# Patient Record
Sex: FEMALE | Race: WHITE | Employment: UNEMPLOYED | URBAN - METROPOLITAN AREA
[De-identification: names, ages, dates, MRNs, and addresses within clinical notes are randomized per-mention and may not be internally consistent; named-entity substitution may affect disease eponyms.]

---

## 2018-12-03 RX ORDER — METFORMIN HYDROCHLORIDE 500 MG/1
500 TABLET ORAL 2 TIMES DAILY
COMMUNITY

## 2018-12-03 RX ORDER — PANTOPRAZOLE SODIUM 40 MG/1
40 TABLET, DELAYED RELEASE ORAL DAILY
COMMUNITY

## 2018-12-04 NOTE — H&P
Date: 10/22/2018 8:39 AM  
Patient Name: Hue Cesar Account #: [id number] Gender: Female  (age): 1970 (57) Provider:    
Marleny Arana. Nina Vela PA-C Referring Physician:    
Mateusz Yun MD 
[address] [place] [address] Street 
[phone] (phone) 
[phone] (fax) Chief Complaint: pt in for vomiting abdominal pain History of Present Illness:  
Patient is here today for an initial visit. She is here today with intermittent episodes of nausea and vomiting x 3 months. Usually occurs in the morning or wakes patient up in the middle night. Nausea tends to reoccur throughout the day and interferes with her eating. Emesis content is food and yellow liquid. Denies hematemesis. Denies dysphagia. Occasionally has associated abdominal pain but she attributes this to her history of IBS. Does have a history of GERD but states her symptoms are usually controlled with pantoprazole. Admits that since foot surgery  she has been taking ibuprofen a few times per week. Had had about 8-10lb weight loss but she's been working on portion control. EGD 2014 - normal 
Colonoscopy 2014 - normal (path: random colon biopsies unremarkable) Personal history of IBS-D: symptoms controlled with Bentyl TID  ? Patient is here today for an initial visit. She is here today with intermittent episodes of nausea and vomiting x 3 months. Usually occurs in the morning or wakes patient up in the middle night. Nausea tends to reoccur throughout the day and interferes with her eating. Emesis content is food and yellow liquid. Denies hematemesis. Denies dysphagia. Occasionally has associated abdominal pain but she attributes this to her history of IBS. Does have a history of GERD but states her symptoms are usually controlled with pantoprazole.  Admits that since foot surgery  she has been taking ibuprofen a few times per week. Had had about 8-10lb weight loss but she's been working on portion control. EGD 2014 - normal 
Colonoscopy 2014 - normal (path: random colon biopsies unremarkable) Personal history of IBS-D: symptoms controlled with Bentyl TID  ? Past Medical History Medical Conditions: Diabetes Mellitus 
schizoaffective disorder Sleep apnea Thyroid disease Surgical Procedures: achilles tendon surgery 
right knee surgery 
cholecystectomy Dx Studies: Abdominal U/S Colonoscopy CT Scan Endoscopy MRI Medications: benztropine 0.5 mg 
clozapine 100 mg 
furosemide 20 mg 
hydroxyzine pamoate 50 mg TAKE 1 TO 2 CAPSULES BY MOUTH AT BEDTIME IF NEEDED FOR ANXIETY OR INSO MNIA 
levothyroxine 150 mcg 
lithium carbonate 600 mg 
metformin 500 mg 
pantoprazole 40 mg 
risperidone 0.25 mg 
sertraline 100 mg 
trazodone 100 mg Xarelto 20 mg Allergies: Patient has no known allergies or drug allergies Immunizations: No Immunizations Social History Alcohol: None Tobacco: Never smoker Drugs: None Exercise: Exercise less than 3 times a week. Caffeine: Daily. Marital Status:   
  
  
Occupation:   
  
  
  
 
Family History No history of Colon Cancer, Colon Polyps, Esophogeal Cancer, GI Cancers, IBD (Crohn's or UC), Liver disease Father: Diagnosed with bladder cancer; Mother: Diagnosed with throat cancer and breast cancer;   
  
Review of Systems:  
Cardiovascular: Denies chest pain, irregular heart beat, palpitations, peripheral edema, syncope, Sweats. Constitutional: Presents suffers from fever. Denies fatigue, loss of appetite, weight gain, weight loss. ENMT: Denies nose bleeds, sore throat, hearing loss. Endocrine: Denies excessive thirst, heat intolerance. Eyes: Denies loss of vision. Gastrointestinal: Presents suffers from abdominal pain, diarrhea, nausea, vomiting.  Denies abdominal swelling, change in bowel habits, constipation, Bloating/gas, heartburn, jaundice, rectal bleeding, stomach cramps, dysphagia, rectal pain, Stool incontinence, hematemesis. Genitourinary: Denies dark urine, dysuria, frequent urination, hematuria, incontinence. Hematologic/Lymphatic: Denies easy bruising, prolonged bleeding. Integumentary: Denies itching, rashes, sun sensitivity. Musculoskeletal: Denies arthritis, back pain, gout, joint pain, muscle weakness, stiffness. Neurological: Denies dizziness, fainting, frequent headaches, memory loss. Psychiatric: Presents suffers from anxiety, depression, difficulty sleeping, nervousness. Denies hallucinations, panic attacks, paranoia. Respiratory: Presents suffers from cough. Denies dyspnea, wheezing. Vital Signs:  
BP 
(mmHg)  Pulse 
(ppm) Rhythm Weight (lbs/oz) Height (ft/in) BMI Temp  
149/82 78 Regular 242 /  5 / 1 45.72 98.5 (F) Physical Exam:  
Constitutional:   
 
Appearance: No distress, appears comfortable. Communication: Understands/receives spoken information. Head/face: Inspection: Normacephalic, atraumatic. Palpation: normal.  
Eyes:   
 
Conjunctivae/lids: Normal.  
Pupils/irises: Pupils equal, round and normal.  
ENMT:   
 
External: Normal.  
Hearing: Normal.  
Neck:   
 
Neck: Normal appearance, trachea midline. Respiratory:   
 
Effort: Normal respiratory effort, comfortable, speaks in complete sentences. Auscultation: normal breath sounds, no rubs, wheezes or rhonchi. Cardiovascular: Auscultation: normal, S1 and S2, no gallops , no rubs or murmurs . Gastrointestinal/Abdomen:   
 
Abdomen: bowel sounds active, soft, obese, mild tenderness in MANAN. No rebound or guarding. Junius El Psychiatric:   
 
Judgment/insight: Normal, normal judgement, normal insight. Orientation: oriented to time, space and person. Memory: normal short term memory, normal long term memory, no memory loss.   
Mood and affect: Normal mood, affect full, no evidence of depression, anxiety or agitation. Lab Results: No Electronic Results Impressions: Nausea with vomiting, unspecified Abdominal tenderness, epigastric Long term (current) use of anticoagulants Diabetes mellitus? ?Nausea with vomiting, unspecified? ? 
? ? Abdominal tenderness, epigastric? ? 
? ? Long term (current) use of anticoagulants? ? 
? ? Diabetes mellitus? Assessment: DDx: GERD, gastritis, PUD, gastroparesis. If EGD unremarkable and symptoms persist then GES to follow ? DDx: GERD, gastritis, PUD, gastroparesis. If EGD unremarkable and symptoms persist then GES to follow Plan: EGD Upper Endoscopy (EGD): options, risks, benefits and complications of bleeding, tear, surgical repair (1:1000) and reaction to medication, aspiration, and pneumonia explained to patient. 5% chance of missing lesions explained. All questions answered. To MA: Please obtain cardiac clearance for patient to stop Xarelto prior to EGD Education: GERD Diet Patient is instructed to Continue PPI at current doses. ? ?EGD? ? 
? ? Upper Endoscopy (EGD): options, risks, benefits and complications of bleeding, tear, surgical repair (1:1000) and reaction to medication, aspiration, and pneumonia explained to patient. 5% chance of missing lesions explained. All questions answered. ?  
? 
? ? To MA: Please obtain cardiac clearance for patient to stop Xarelto prior to EGD?  
? 
? ? Education: GERD Diet? ? ?Patient is instructed to ? Continue PPI at current doses. ? Risk & Medical Necessity: The patient requires Moderate Severity care for this visit. Diagnosis and management options are Extensive. The amount of data reviewed and/or ordered is Minimal/None. The level of risk is Moderate. Notes: Dr. Nico Jeffrey was available for consultation during this visit. John Scales. Philip Jimenez PA-C Electronically signed on 10/22/2018 9:03:48 AM by John Scales. Philip Jimenez PA-C [employer] Speaker. Artemus Kawasaki, MRN [id number],  1970 First Visit, Monday, 2018

## 2018-12-05 ENCOUNTER — ANESTHESIA EVENT (OUTPATIENT)
Dept: ENDOSCOPY | Age: 48
End: 2018-12-05
Payer: MEDICARE

## 2018-12-05 ENCOUNTER — HOSPITAL ENCOUNTER (OUTPATIENT)
Age: 48
Setting detail: OUTPATIENT SURGERY
Discharge: HOME | End: 2018-12-05
Attending: SPECIALIST | Admitting: SPECIALIST
Payer: MEDICARE

## 2018-12-05 ENCOUNTER — ANESTHESIA (OUTPATIENT)
Dept: ENDOSCOPY | Age: 48
End: 2018-12-05
Payer: MEDICARE

## 2018-12-05 VITALS
RESPIRATION RATE: 26 BRPM | BODY MASS INDEX: 43.23 KG/M2 | HEART RATE: 76 BPM | DIASTOLIC BLOOD PRESSURE: 74 MMHG | SYSTOLIC BLOOD PRESSURE: 129 MMHG | WEIGHT: 229 LBS | HEIGHT: 61 IN | OXYGEN SATURATION: 100 % | TEMPERATURE: 98.1 F

## 2018-12-05 LAB
GLUCOSE BLD STRIP.AUTO-MCNC: 106 MG/DL (ref 65–100)
SERVICE CMNT-IMP: ABNORMAL

## 2018-12-05 PROCEDURE — 76040000019: Performed by: SPECIALIST

## 2018-12-05 PROCEDURE — 88305 TISSUE EXAM BY PATHOLOGIST: CPT

## 2018-12-05 PROCEDURE — 76060000031 HC ANESTHESIA FIRST 0.5 HR: Performed by: SPECIALIST

## 2018-12-05 PROCEDURE — 74011250636 HC RX REV CODE- 250/636: Performed by: PHYSICIAN ASSISTANT

## 2018-12-05 PROCEDURE — 77030009426 HC FCPS BIOP ENDOSC BSC -B: Performed by: SPECIALIST

## 2018-12-05 PROCEDURE — 82962 GLUCOSE BLOOD TEST: CPT

## 2018-12-05 PROCEDURE — 74011250636 HC RX REV CODE- 250/636

## 2018-12-05 RX ORDER — RISPERIDONE 0.25 MG/1
TABLET, FILM COATED ORAL
COMMUNITY

## 2018-12-05 RX ORDER — DEXTROMETHORPHAN/PSEUDOEPHED 2.5-7.5/.8
1.2 DROPS ORAL
Status: DISCONTINUED | OUTPATIENT
Start: 2018-12-05 | End: 2018-12-05 | Stop reason: HOSPADM

## 2018-12-05 RX ORDER — MIDAZOLAM HYDROCHLORIDE 1 MG/ML
.25-5 INJECTION, SOLUTION INTRAMUSCULAR; INTRAVENOUS AS NEEDED
Status: DISCONTINUED | OUTPATIENT
Start: 2018-12-05 | End: 2018-12-05 | Stop reason: HOSPADM

## 2018-12-05 RX ORDER — SODIUM CHLORIDE 9 MG/ML
50 INJECTION, SOLUTION INTRAVENOUS CONTINUOUS
Status: DISCONTINUED | OUTPATIENT
Start: 2018-12-05 | End: 2018-12-05 | Stop reason: HOSPADM

## 2018-12-05 RX ORDER — EPINEPHRINE 0.1 MG/ML
1 INJECTION INTRACARDIAC; INTRAVENOUS
Status: DISCONTINUED | OUTPATIENT
Start: 2018-12-05 | End: 2018-12-05 | Stop reason: HOSPADM

## 2018-12-05 RX ORDER — SODIUM CHLORIDE 9 MG/ML
INJECTION, SOLUTION INTRAVENOUS
Status: DISCONTINUED | OUTPATIENT
Start: 2018-12-05 | End: 2018-12-05 | Stop reason: HOSPADM

## 2018-12-05 RX ORDER — NALOXONE HYDROCHLORIDE 0.4 MG/ML
0.4 INJECTION, SOLUTION INTRAMUSCULAR; INTRAVENOUS; SUBCUTANEOUS
Status: DISCONTINUED | OUTPATIENT
Start: 2018-12-05 | End: 2018-12-05 | Stop reason: HOSPADM

## 2018-12-05 RX ORDER — BENZTROPINE MESYLATE 0.5 MG/1
0.5 TABLET ORAL 2 TIMES DAILY
COMMUNITY
End: 2020-05-18

## 2018-12-05 RX ORDER — ATROPINE SULFATE 0.1 MG/ML
0.5 INJECTION INTRAVENOUS
Status: DISCONTINUED | OUTPATIENT
Start: 2018-12-05 | End: 2018-12-05 | Stop reason: HOSPADM

## 2018-12-05 RX ORDER — TRAZODONE HYDROCHLORIDE 100 MG/1
200 TABLET ORAL
COMMUNITY
End: 2020-05-18

## 2018-12-05 RX ORDER — PROPOFOL 10 MG/ML
INJECTION, EMULSION INTRAVENOUS AS NEEDED
Status: DISCONTINUED | OUTPATIENT
Start: 2018-12-05 | End: 2018-12-05 | Stop reason: HOSPADM

## 2018-12-05 RX ORDER — FENTANYL CITRATE 50 UG/ML
25 INJECTION, SOLUTION INTRAMUSCULAR; INTRAVENOUS AS NEEDED
Status: DISCONTINUED | OUTPATIENT
Start: 2018-12-05 | End: 2018-12-05 | Stop reason: HOSPADM

## 2018-12-05 RX ORDER — FLUMAZENIL 0.1 MG/ML
0.2 INJECTION INTRAVENOUS
Status: DISCONTINUED | OUTPATIENT
Start: 2018-12-05 | End: 2018-12-05 | Stop reason: HOSPADM

## 2018-12-05 RX ADMIN — PROPOFOL 30 MG: 10 INJECTION, EMULSION INTRAVENOUS at 11:24

## 2018-12-05 RX ADMIN — PROPOFOL 70 MG: 10 INJECTION, EMULSION INTRAVENOUS at 11:20

## 2018-12-05 RX ADMIN — PROPOFOL 30 MG: 10 INJECTION, EMULSION INTRAVENOUS at 11:22

## 2018-12-05 RX ADMIN — SODIUM CHLORIDE 50 ML/HR: 9 INJECTION, SOLUTION INTRAVENOUS at 10:49

## 2018-12-05 RX ADMIN — SODIUM CHLORIDE: 9 INJECTION, SOLUTION INTRAVENOUS at 10:41

## 2018-12-05 NOTE — ANESTHESIA POSTPROCEDURE EVALUATION
Procedure(s): ESOPHAGOGASTRODUODENOSCOPY (EGD) ESOPHAGOGASTRODUODENAL (EGD) BIOPSY. Anesthesia Post Evaluation Patient location during evaluation: PACU Level of consciousness: awake Pain management: adequate Airway patency: patent Anesthetic complications: no 
Cardiovascular status: acceptable Respiratory status: acceptable Hydration status: acceptable Visit Vitals /74 Pulse 76 Temp 36.7 °C (98.1 °F) Resp 26 Ht 5' 1\" (1.549 m) Wt 103.9 kg (229 lb) SpO2 100% BMI 43.27 kg/m²

## 2018-12-05 NOTE — PERIOP NOTES
Endoscope was pre-cleaned at bedside immediately following procedure by [facility].

## 2018-12-05 NOTE — PROCEDURES
[address], [place]  [phone]      2018    Esophagogastroduodenoscopy (EGD) Procedure Note  Lisa Rich  : 1970  [employer] Medical Record Number: [id number]      Indications:    Vomiting, persitent of unclear etilogy  Referring Physician:  Claude Candy, MD  Anesthesia/Sedation:  Conscious sedation/deep sedation/monitored anesthesia -- see notes. Endoscopist:  Dr. Neftaly Hess  Complications:  None  Estimated Blood Loss:  None    Permit:  The indications, risks, benefits and alternatives were reviewed with the patient or their decision maker who was provided an opportunity to ask questions and all questions were answered. The specific risks of esophagogastroduodenoscopy with conscious sedation were reviewed, including but not limited to anesthetic complication, bleeding, adverse drug reaction, missed lesion, infection, IV site reactions, and intestinal perforation which would lead to the need for surgical repair. Alternatives to EGD including radiographic imaging, observation without testing, or laboratory testing were reviewed as well as the limitations of those alternatives discussed. After considering the options and having all their questions answered, the patient or their decision maker provided both verbal and written consent to proceed. Procedure in Detail:  After obtaining informed consent, positioning of the patient in the left lateral decubitus position, and conduction of a pre-procedure pause or \"time out\" the endoscope was introduced into the mouth and advanced to the duodenum. A careful inspection was made, and findings or interventions are described below. Findings:   Esophagus:normal  Stomach: normal.  Cold forceps biopsies obtained for histology.   Duodenum/jejunum: normal.  A biopsy was taken from the duodenal mucosa for evaluation of villous atrophy or giardiasis. Hemostasis is confirmed from biopsy sites. Specimens: See above    Impression: Normal upper GI examination today. Biopsies taken to exclude microscopic abnormalities. Recommendations:  -If biopsies unrevealing will proceed with gastric emptying study. Thank you for entrusting me with this patient's care. Please do not hesitate to contact me with any questions or if I can be of assistance with any of your other patients' GI needs.   Signed By: Marshal Reyna MD                        December 5, 2018     Surgical assistant none

## 2018-12-05 NOTE — PERIOP NOTES
Patient resting comfortably on stretcher HOB up VSS call bell within reach awaiting MD for procedure will continue to monitor pt.

## 2018-12-05 NOTE — ROUTINE PROCESS
[facility] 1970 
[id number] Situation: 
Verbal report received from: Jordan Colin RN Procedure: Procedure(s): ESOPHAGOGASTRODUODENOSCOPY (EGD) Background: 
 
Preoperative diagnosis: NAUSEA WITH VOMITING, EPIGASTRIC TENDERNESS Postoperative diagnosis: * No post-op diagnosis entered * :  Dr. Adryan Gonzalez Assistant(s): Endoscopy RN-1: Andrea Tyler RN Endoscopy RN-2: Gary Kimbrough RN Specimens: * No specimens in log * H. Pylori  no Assessment: 
Intra-procedure medications Anesthesia gave intra-procedure sedation and medications, see anesthesia flow sheet yes Intravenous fluids: NS@ Sindy Mourning Vital signs stable Abdominal assessment: round and soft Recommendation: 
Discharge patient per MD order. Family or Friend Permission to share finding with family or friend yes

## 2018-12-05 NOTE — INTERVAL H&P NOTE
Pre-Endoscopy H&P Update Chief complaint/HPI/ROS:  The indication for the procedure, the patient's history and the patient's current medications are reviewed prior to the procedure and that data is reported on the H&P to which this document is attached. Any significant complaints with regard to organ systems will be noted, and if not mentioned then a review of systems is not contributory. Past Medical History:  
Diagnosis Date  Anxiety disorder  Bipolar affective ([facility]  Depression   
 depression, Schizo affective disorder, bipolar  Diabetes ([address]  GERD (gastroesophageal reflux disease)  Hypothyroid  Mood disorder ([address]  Morbid obesity ([address]  Other ill-defined conditions(799.89)   
 catheter with plug in place removed 3/27/14  
 Other ill-defined conditions(799.89) chronic uti's-last one 8/2014  Other ill-defined conditions(799.89) IBS  Other ill-defined conditions(799.89) hypothyroidism  PE (pulmonary embolism) 2011  
 hospitalized for 2 weeks  Schizoaffective disorder ([facility]  Sleep apnea   
 uses cpap  Urinary retention   
 pt states much improved since bladder stimulator placed Past Surgical History:  
Procedure Laterality Date  HX CHOLECYSTECTOMY  2001  HX GYN  1/2014  
 uterine cyst removed  HX GYN  8/2014  
 ablation [facility] GYN  2014  
 hysterectomy [facility] ORTHOPAEDIC  2004, 2016, 2018  
 left ankle  HX ORTHOPAEDIC  2018  
 meniscus repair  HX UROLOGICAL  3/27/14 Bladder stimulator Social  
Social History Tobacco Use  Smoking status: Never Smoker  Smokeless tobacco: Never Used Substance Use Topics  Alcohol use: No  
  
Family History Problem Relation Age of Onset  Heart Attack Father 43  Hypertension Father  High Cholesterol Father  Heart Disease Father  Cancer Father  Cancer Mother No Known Allergies Prior to Admission Medications Prescriptions Last Dose Informant Patient Reported? Taking? CLOZAPINE (CLOZARIL PO) 12/4/2018 at Unknown time  Yes Yes Sig: Take 200 mg by mouth every morning. CLOZAPINE (CLOZARIL PO) 12/4/2018 at Unknown time  Yes Yes Sig: Take 300 mg by mouth nightly. benztropine (COGENTIN) 0.5 mg tablet 12/4/2018 at Unknown time  Yes Yes Sig: Take 0.5 mg by mouth two (2) times a day. levothyroxine (SYNTHROID) 150 mcg tablet 12/4/2018 at Unknown time  Yes Yes Sig: Take 150 mcg by mouth Daily (before breakfast). lithium carbonate 600 mg capsule 12/4/2018 at Unknown time  Yes Yes Sig: Take 600 mg by mouth two (2) times daily (with meals). metFORMIN (GLUCOPHAGE) 500 mg tablet 12/4/2018 at Unknown time  Yes Yes Sig: Take 500 mg by mouth Before breakfast, lunch, dinner and at bedtime. pantoprazole (PROTONIX) 40 mg tablet 12/4/2018 at Unknown time  Yes Yes Sig: Take 40 mg by mouth daily. risperiDONE (RISPERDAL) 0.25 mg tablet 12/4/2018 at Unknown time  Yes Yes Sig: Take  by mouth.  
rivaroxaban (XARELTO) 20 mg tab tablet 12/2/2018 at Unknown time  Yes Yes Sig: Take 20 mg by mouth daily (with breakfast). sertraline (ZOLOFT) 100 mg tablet 12/4/2018 at Unknown time  Yes Yes Sig: Take 100 mg by mouth daily. traZODone (DESYREL) 100 mg tablet 12/4/2018 at Unknown time  Yes Yes Sig: Take 200 mg by mouth nightly. Facility-Administered Medications: None PHYSICAL EXAM:  The patient is examined immediately prior to the procedure. Visit Vitals /72 Pulse 80 Temp 98.2 °F (36.8 °C) Resp 18 Ht 5' 1\" (1.549 m) Wt 103.9 kg (229 lb) SpO2 98% BMI 43.27 kg/m² Gen: Appears comfortable, no distress. Pulm: comfortable respirations with no abnormal audible breath sounds HEART: well perfused, no abnormal audible heart sounds GI: abdomen flat.  
 
PLAN:  Informed consent discussion held, patient afforded an opportunity to ask questions and all questions answered. After being advised of the risks, benefits, and alternatives, the patient requested that we proceed and indicated so on a written consent form. Will proceed with procedure as planned.  
Kirsten Pulido MD

## 2018-12-05 NOTE — PROGRESS NOTES
Dr Pineda Nickel in to speak to patient and  Discharge instructions were given bedside with patient and , [place]

## 2018-12-05 NOTE — DISCHARGE INSTRUCTIONS
[address] [facility] [place] LUISA Ramos MD  [phone]      December 5, 2018     Omar Pollack  YOB: 1970    ENDOSCOPY DISCHARGE INSTRUCTIONS    If there is redness at IV site you should apply warm compress to area. If redness or soreness persist contact Dr. Shun Ramos' or your primary care doctor. Gaseous discomfort may develop, but walking, belching will help relieve this. You may not operate a vehicle for 12 hours  You may not operate machinery or dangerous appliances for rest of today  You may not drink alcoholic beverages for 12 hours  Avoid making any critical decisions for 24 hours    DIET:  You may resume your normal diet, but some patients find that heavy or large meals may lead to indigestion or vomiting. I suggest a light meal as first food intake. MEDICATIONS:  The use of some over-the-counter pain medication may lead to bleeding after biopsies or other procedures you may have had done. Tylenol (also called acetaminophen) is safe to take and will not lead to bleeding. Based on the procedure you had today you may not safely take aspirin or aspirin-like products for the next ten (10) days. ACTIVITY:  You may resume your normal household activities, but it is recommended that you spend the remainder of the day resting -  avoid any strenuous activity. CALL DR. Rochelle Hyatt' OFFICE IF:  Increasing pain, nausea, vomiting  Abdominal distension (swelling)  Significant new or increased bleeding (oral or rectal)  Fever/Chills  Chest pain/shortness of breath                   Additional instructions:   No aspirin 10 days  Resume xarelto Tomorrow if there is no bleeding  We didn't see any significant abnormalities today, but I took biopsies to make sure that there's no microscopic problems  I'll contact you with the results and plans based on the results in about a week     It was an honor to be your doctor today. Please let me or my office staff know if you have any feedback about today's procedure.     Leodan Gibbons MD

## 2018-12-18 ENCOUNTER — HOSPITAL ENCOUNTER (OUTPATIENT)
Dept: NUCLEAR MEDICINE | Age: 48
Discharge: HOME | End: 2018-12-18
Attending: PHYSICIAN ASSISTANT
Payer: MEDICARE

## 2018-12-18 DIAGNOSIS — R11.2 NAUSEA WITH VOMITING, UNSPECIFIED: ICD-10-CM

## 2018-12-18 DIAGNOSIS — R10.826 EPIGASTRIC REBOUND ABDOMINAL TENDERNESS: ICD-10-CM

## 2018-12-18 PROCEDURE — 78264 GASTRIC EMPTYING IMG STUDY: CPT

## 2019-04-17 ENCOUNTER — HOSPITAL ENCOUNTER (OUTPATIENT)
Dept: GENERAL RADIOLOGY | Age: 49
Discharge: HOME | End: 2019-04-17
Payer: MEDICARE

## 2019-04-17 DIAGNOSIS — J20.9 ACUTE BRONCHITIS: ICD-10-CM

## 2019-04-17 PROCEDURE — 71046 X-RAY EXAM CHEST 2 VIEWS: CPT

## 2020-05-18 ENCOUNTER — HOSPITAL ENCOUNTER (OUTPATIENT)
Dept: PREADMISSION TESTING | Age: 50
Discharge: HOME | End: 2020-05-18
Payer: MEDICARE

## 2020-05-18 VITALS
DIASTOLIC BLOOD PRESSURE: 68 MMHG | WEIGHT: 252 LBS | TEMPERATURE: 98.2 F | SYSTOLIC BLOOD PRESSURE: 118 MMHG | BODY MASS INDEX: 47.58 KG/M2 | HEIGHT: 61 IN | OXYGEN SATURATION: 97 % | HEART RATE: 62 BPM | RESPIRATION RATE: 16 BRPM

## 2020-05-18 LAB
ANION GAP SERPL CALC-SCNC: 7 MMOL/L (ref 5–15)
ATRIAL RATE: 77 BPM
BUN SERPL-MCNC: 11 MG/DL (ref 6–20)
BUN/CREAT SERPL: 14 (ref 12–20)
CALCIUM SERPL-MCNC: 9.3 MG/DL (ref 8.5–10.1)
CALCULATED P AXIS, ECG09: 60 DEGREES
CALCULATED R AXIS, ECG10: 10 DEGREES
CALCULATED T AXIS, ECG11: 52 DEGREES
CHLORIDE SERPL-SCNC: 107 MMOL/L (ref 97–108)
CO2 SERPL-SCNC: 26 MMOL/L (ref 21–32)
CREAT SERPL-MCNC: 0.78 MG/DL (ref 0.55–1.02)
DIAGNOSIS, 93000: NORMAL
ERYTHROCYTE [DISTWIDTH] IN BLOOD BY AUTOMATED COUNT: 15.1 % (ref 11.5–14.5)
GLUCOSE SERPL-MCNC: 111 MG/DL (ref 65–100)
HCT VFR BLD AUTO: 35.9 % (ref 35–47)
HGB BLD-MCNC: 10.7 G/DL (ref 11.5–16)
MCH RBC QN AUTO: 24.5 PG (ref 26–34)
MCHC RBC AUTO-ENTMCNC: 29.8 G/DL (ref 30–36.5)
MCV RBC AUTO: 82.3 FL (ref 80–99)
NRBC # BLD: 0 K/UL (ref 0–0.01)
NRBC BLD-RTO: 0 PER 100 WBC
P-R INTERVAL, ECG05: 188 MS
PLATELET # BLD AUTO: 244 K/UL (ref 150–400)
PMV BLD AUTO: 10.7 FL (ref 8.9–12.9)
POTASSIUM SERPL-SCNC: 4.2 MMOL/L (ref 3.5–5.1)
Q-T INTERVAL, ECG07: 420 MS
QRS DURATION, ECG06: 84 MS
QTC CALCULATION (BEZET), ECG08: 475 MS
RBC # BLD AUTO: 4.36 M/UL (ref 3.8–5.2)
SODIUM SERPL-SCNC: 140 MMOL/L (ref 136–145)
VENTRICULAR RATE, ECG03: 77 BPM
WBC # BLD AUTO: 5.2 K/UL (ref 3.6–11)

## 2020-05-18 PROCEDURE — 83036 HEMOGLOBIN GLYCOSYLATED A1C: CPT

## 2020-05-18 PROCEDURE — 36415 COLL VENOUS BLD VENIPUNCTURE: CPT

## 2020-05-18 PROCEDURE — 93005 ELECTROCARDIOGRAM TRACING: CPT

## 2020-05-18 PROCEDURE — 80048 BASIC METABOLIC PNL TOTAL CA: CPT

## 2020-05-18 PROCEDURE — 85027 COMPLETE CBC AUTOMATED: CPT

## 2020-05-18 RX ORDER — HYDROXYZINE 25 MG/1
TABLET, FILM COATED ORAL
COMMUNITY

## 2020-05-18 RX ORDER — BUDESONIDE AND FORMOTEROL FUMARATE DIHYDRATE 80; 4.5 UG/1; UG/1
2 AEROSOL RESPIRATORY (INHALATION)
COMMUNITY

## 2020-05-18 RX ORDER — MONTELUKAST SODIUM 10 MG/1
10 TABLET ORAL DAILY
COMMUNITY

## 2020-05-18 RX ORDER — DICYCLOMINE HYDROCHLORIDE 10 MG/1
10 CAPSULE ORAL
COMMUNITY

## 2020-05-18 RX ORDER — ROSUVASTATIN CALCIUM 5 MG/1
TABLET, COATED ORAL DAILY
COMMUNITY

## 2020-05-18 NOTE — PERIOP NOTES
PREOP, VERBAL & WRITTEN INSTRUCTIONS GIVEN TO PT. PATIENT GIVEN SURGICAL SITE INFECTION FAQs HANDOUT. PT GIVEN 2 BOTTLES OF CHG SOL WITH VERBAL & WRITTEN INSTRUCTIONS. PT GIVEN OPPORTUNITY TO EXPRESS CONCERNS & ASK QUESTIONS. PT GIVEN VERBAL & WRITTEN INSTRUCTIONS FOR THE PROCESS OF REGISTRATION ON DAY OF SURGERY, & ALSO FOR COVID TESTING.

## 2020-05-19 LAB
EST. AVERAGE GLUCOSE BLD GHB EST-MCNC: 140 MG/DL
HBA1C MFR BLD: 6.5 % (ref 4–5.6)

## 2020-05-22 ENCOUNTER — HOSPITAL ENCOUNTER (OUTPATIENT)
Dept: PREADMISSION TESTING | Age: 50
Discharge: HOME | End: 2020-05-22
Attending: NURSE PRACTITIONER
Payer: MEDICARE

## 2020-05-22 DIAGNOSIS — Z20.822 ENCOUNTER FOR LABORATORY TESTING FOR COVID-19 VIRUS: ICD-10-CM

## 2020-05-22 PROCEDURE — 87635 SARS-COV-2 COVID-19 AMP PRB: CPT

## 2020-05-23 ENCOUNTER — ANESTHESIA EVENT (OUTPATIENT)
Dept: SURGERY | Age: 50
End: 2020-05-23
Payer: MEDICARE

## 2020-05-23 LAB — SARS-COV-2, COV2NT: NOT DETECTED

## 2020-05-26 ENCOUNTER — ANESTHESIA (OUTPATIENT)
Dept: SURGERY | Age: 50
End: 2020-05-26
Payer: MEDICARE

## 2020-05-26 ENCOUNTER — HOSPITAL ENCOUNTER (OUTPATIENT)
Age: 50
Setting detail: OUTPATIENT SURGERY
Discharge: HOME | End: 2020-05-26
Attending: SPECIALIST | Admitting: SPECIALIST
Payer: MEDICARE

## 2020-05-26 VITALS
OXYGEN SATURATION: 96 % | HEART RATE: 77 BPM | HEIGHT: 61 IN | TEMPERATURE: 97.6 F | WEIGHT: 252 LBS | SYSTOLIC BLOOD PRESSURE: 132 MMHG | BODY MASS INDEX: 47.58 KG/M2 | DIASTOLIC BLOOD PRESSURE: 58 MMHG | RESPIRATION RATE: 19 BRPM

## 2020-05-26 DIAGNOSIS — L76.82 PAIN AT SURGICAL INCISION: Primary | ICD-10-CM

## 2020-05-26 LAB
GLUCOSE BLD STRIP.AUTO-MCNC: 186 MG/DL (ref 65–100)
GLUCOSE BLD STRIP.AUTO-MCNC: 200 MG/DL (ref 65–100)
SERVICE CMNT-IMP: ABNORMAL
SERVICE CMNT-IMP: ABNORMAL

## 2020-05-26 PROCEDURE — 74011250636 HC RX REV CODE- 250/636: Performed by: SPECIALIST

## 2020-05-26 PROCEDURE — 77030040922 HC BLNKT HYPOTHRM STRY -A

## 2020-05-26 PROCEDURE — 82962 GLUCOSE BLOOD TEST: CPT

## 2020-05-26 PROCEDURE — 74011250636 HC RX REV CODE- 250/636: Performed by: NURSE ANESTHETIST, CERTIFIED REGISTERED

## 2020-05-26 PROCEDURE — 77030008684 HC TU ET CUF COVD -B: Performed by: ANESTHESIOLOGY

## 2020-05-26 PROCEDURE — 74011250636 HC RX REV CODE- 250/636: Performed by: ANESTHESIOLOGY

## 2020-05-26 PROCEDURE — 74011000250 HC RX REV CODE- 250: Performed by: SPECIALIST

## 2020-05-26 PROCEDURE — 74011000272 HC RX REV CODE- 272: Performed by: SPECIALIST

## 2020-05-26 PROCEDURE — 74011000258 HC RX REV CODE- 258: Performed by: NURSE ANESTHETIST, CERTIFIED REGISTERED

## 2020-05-26 PROCEDURE — 77030019615 HC ELCTRD EMG NDL MEDT -B: Performed by: SPECIALIST

## 2020-05-26 PROCEDURE — 76210000016 HC OR PH I REC 1 TO 1.5 HR: Performed by: SPECIALIST

## 2020-05-26 PROCEDURE — 74011250637 HC RX REV CODE- 250/637: Performed by: SPECIALIST

## 2020-05-26 PROCEDURE — 74011000250 HC RX REV CODE- 250: Performed by: NURSE ANESTHETIST, CERTIFIED REGISTERED

## 2020-05-26 PROCEDURE — 76210000021 HC REC RM PH II 0.5 TO 1 HR: Performed by: SPECIALIST

## 2020-05-26 PROCEDURE — 77030014006 HC SPNG HEMSTAT J&J -A: Performed by: SPECIALIST

## 2020-05-26 PROCEDURE — L8613 OSSICULAR IMPLANT: HCPCS | Performed by: SPECIALIST

## 2020-05-26 PROCEDURE — 77030040361 HC SLV COMPR DVT MDII -B: Performed by: SPECIALIST

## 2020-05-26 PROCEDURE — 77030002974 HC SUT PLN J&J -A: Performed by: SPECIALIST

## 2020-05-26 PROCEDURE — 77030006932 HC BLD TYMP BVR BD -B: Performed by: SPECIALIST

## 2020-05-26 PROCEDURE — 77030018836 HC SOL IRR NACL ICUM -A: Performed by: SPECIALIST

## 2020-05-26 PROCEDURE — 77030018846 HC SOL IRR STRL H20 ICUM -A: Performed by: SPECIALIST

## 2020-05-26 PROCEDURE — 77030006689 HC BLD OPHTH BVR BD -A: Performed by: SPECIALIST

## 2020-05-26 PROCEDURE — 76060000034 HC ANESTHESIA 1.5 TO 2 HR: Performed by: SPECIALIST

## 2020-05-26 PROCEDURE — 77030008570 HC TBNG SUC IRR GRAC -B: Performed by: SPECIALIST

## 2020-05-26 PROCEDURE — 76010000162 HC OR TIME 1.5 TO 2 HR INTENSV-TIER 1: Performed by: SPECIALIST

## 2020-05-26 PROCEDURE — 77030026438 HC STYL ET INTUB CARD -A: Performed by: ANESTHESIOLOGY

## 2020-05-26 DEVICE — PROSTHESIS OSS STAP 5X4.5 MM MTRX OFFSET BND LOOP ROUNDED: Type: IMPLANTABLE DEVICE | Site: EAR | Status: FUNCTIONAL

## 2020-05-26 RX ORDER — LIDOCAINE HYDROCHLORIDE 10 MG/ML
0.5 INJECTION, SOLUTION EPIDURAL; INFILTRATION; INTRACAUDAL; PERINEURAL AS NEEDED
Status: DISCONTINUED | OUTPATIENT
Start: 2020-05-26 | End: 2020-05-26 | Stop reason: HOSPADM

## 2020-05-26 RX ORDER — NEOSTIGMINE METHYLSULFATE 1 MG/ML
INJECTION, SOLUTION INTRAVENOUS AS NEEDED
Status: DISCONTINUED | OUTPATIENT
Start: 2020-05-26 | End: 2020-05-26 | Stop reason: HOSPADM

## 2020-05-26 RX ORDER — FENTANYL CITRATE 50 UG/ML
INJECTION, SOLUTION INTRAMUSCULAR; INTRAVENOUS AS NEEDED
Status: DISCONTINUED | OUTPATIENT
Start: 2020-05-26 | End: 2020-05-26 | Stop reason: HOSPADM

## 2020-05-26 RX ORDER — SODIUM CHLORIDE, SODIUM LACTATE, POTASSIUM CHLORIDE, CALCIUM CHLORIDE 600; 310; 30; 20 MG/100ML; MG/100ML; MG/100ML; MG/100ML
125 INJECTION, SOLUTION INTRAVENOUS CONTINUOUS
Status: DISCONTINUED | OUTPATIENT
Start: 2020-05-26 | End: 2020-05-26 | Stop reason: HOSPADM

## 2020-05-26 RX ORDER — SODIUM CHLORIDE 0.9 % (FLUSH) 0.9 %
5-40 SYRINGE (ML) INJECTION AS NEEDED
Status: DISCONTINUED | OUTPATIENT
Start: 2020-05-26 | End: 2020-05-26 | Stop reason: HOSPADM

## 2020-05-26 RX ORDER — SODIUM CHLORIDE 0.9 % (FLUSH) 0.9 %
5-40 SYRINGE (ML) INJECTION EVERY 8 HOURS
Status: DISCONTINUED | OUTPATIENT
Start: 2020-05-26 | End: 2020-05-26 | Stop reason: HOSPADM

## 2020-05-26 RX ORDER — HYDROCODONE BITARTRATE AND ACETAMINOPHEN 5; 325 MG/1; MG/1
1-2 TABLET ORAL
Qty: 20 TAB | Refills: 0 | Status: SHIPPED | OUTPATIENT
Start: 2020-05-26 | End: 2020-05-31

## 2020-05-26 RX ORDER — ROCURONIUM BROMIDE 10 MG/ML
INJECTION, SOLUTION INTRAVENOUS AS NEEDED
Status: DISCONTINUED | OUTPATIENT
Start: 2020-05-26 | End: 2020-05-26 | Stop reason: HOSPADM

## 2020-05-26 RX ORDER — PROPOFOL 10 MG/ML
INJECTION, EMULSION INTRAVENOUS AS NEEDED
Status: DISCONTINUED | OUTPATIENT
Start: 2020-05-26 | End: 2020-05-26 | Stop reason: HOSPADM

## 2020-05-26 RX ORDER — FENTANYL CITRATE 50 UG/ML
25 INJECTION, SOLUTION INTRAMUSCULAR; INTRAVENOUS
Status: DISCONTINUED | OUTPATIENT
Start: 2020-05-26 | End: 2020-05-26 | Stop reason: HOSPADM

## 2020-05-26 RX ORDER — DEXTROSE, SODIUM CHLORIDE, SODIUM LACTATE, POTASSIUM CHLORIDE, AND CALCIUM CHLORIDE 5; .6; .31; .03; .02 G/100ML; G/100ML; G/100ML; G/100ML; G/100ML
125 INJECTION, SOLUTION INTRAVENOUS CONTINUOUS
Status: DISCONTINUED | OUTPATIENT
Start: 2020-05-26 | End: 2020-05-26 | Stop reason: HOSPADM

## 2020-05-26 RX ORDER — CEFAZOLIN SODIUM/WATER 2 G/20 ML
2 SYRINGE (ML) INTRAVENOUS ONCE
Status: COMPLETED | OUTPATIENT
Start: 2020-05-26 | End: 2020-05-26

## 2020-05-26 RX ORDER — EPINEPHRINE NASAL SOLUTION 1 MG/ML
SOLUTION NASAL AS NEEDED
Status: DISCONTINUED | OUTPATIENT
Start: 2020-05-26 | End: 2020-05-26 | Stop reason: HOSPADM

## 2020-05-26 RX ORDER — AMOXICILLIN AND CLAVULANATE POTASSIUM 875; 125 MG/1; MG/1
1 TABLET, FILM COATED ORAL 2 TIMES DAILY
Qty: 10 TAB | Refills: 0 | Status: SHIPPED | OUTPATIENT
Start: 2020-05-26 | End: 2020-05-31

## 2020-05-26 RX ORDER — FENTANYL CITRATE 50 UG/ML
50 INJECTION, SOLUTION INTRAMUSCULAR; INTRAVENOUS AS NEEDED
Status: DISCONTINUED | OUTPATIENT
Start: 2020-05-26 | End: 2020-05-26 | Stop reason: HOSPADM

## 2020-05-26 RX ORDER — ONDANSETRON 2 MG/ML
INJECTION INTRAMUSCULAR; INTRAVENOUS AS NEEDED
Status: DISCONTINUED | OUTPATIENT
Start: 2020-05-26 | End: 2020-05-26 | Stop reason: HOSPADM

## 2020-05-26 RX ORDER — ACETAMINOPHEN 325 MG/1
650 TABLET ORAL ONCE
Status: DISCONTINUED | OUTPATIENT
Start: 2020-05-26 | End: 2020-05-26 | Stop reason: HOSPADM

## 2020-05-26 RX ORDER — OXYCODONE HYDROCHLORIDE 5 MG/1
5 TABLET ORAL AS NEEDED
Status: DISCONTINUED | OUTPATIENT
Start: 2020-05-26 | End: 2020-05-26 | Stop reason: HOSPADM

## 2020-05-26 RX ORDER — LIDOCAINE HYDROCHLORIDE 20 MG/ML
INJECTION, SOLUTION EPIDURAL; INFILTRATION; INTRACAUDAL; PERINEURAL AS NEEDED
Status: DISCONTINUED | OUTPATIENT
Start: 2020-05-26 | End: 2020-05-26 | Stop reason: HOSPADM

## 2020-05-26 RX ORDER — BACITRACIN 500 [USP'U]/G
OINTMENT TOPICAL AS NEEDED
Status: DISCONTINUED | OUTPATIENT
Start: 2020-05-26 | End: 2020-05-26 | Stop reason: HOSPADM

## 2020-05-26 RX ORDER — DEXAMETHASONE SODIUM PHOSPHATE 4 MG/ML
INJECTION, SOLUTION INTRA-ARTICULAR; INTRALESIONAL; INTRAMUSCULAR; INTRAVENOUS; SOFT TISSUE AS NEEDED
Status: DISCONTINUED | OUTPATIENT
Start: 2020-05-26 | End: 2020-05-26

## 2020-05-26 RX ORDER — GLYCOPYRROLATE 0.2 MG/ML
INJECTION INTRAMUSCULAR; INTRAVENOUS AS NEEDED
Status: DISCONTINUED | OUTPATIENT
Start: 2020-05-26 | End: 2020-05-26 | Stop reason: HOSPADM

## 2020-05-26 RX ORDER — LIDOCAINE HYDROCHLORIDE AND EPINEPHRINE 10; 10 MG/ML; UG/ML
1.5 INJECTION, SOLUTION INFILTRATION; PERINEURAL ONCE
Status: COMPLETED | OUTPATIENT
Start: 2020-05-26 | End: 2020-05-26

## 2020-05-26 RX ORDER — MORPHINE SULFATE 10 MG/ML
2 INJECTION, SOLUTION INTRAMUSCULAR; INTRAVENOUS
Status: DISCONTINUED | OUTPATIENT
Start: 2020-05-26 | End: 2020-05-26 | Stop reason: HOSPADM

## 2020-05-26 RX ORDER — MIDAZOLAM HYDROCHLORIDE 1 MG/ML
INJECTION, SOLUTION INTRAMUSCULAR; INTRAVENOUS AS NEEDED
Status: DISCONTINUED | OUTPATIENT
Start: 2020-05-26 | End: 2020-05-26 | Stop reason: HOSPADM

## 2020-05-26 RX ORDER — SUCCINYLCHOLINE CHLORIDE 20 MG/ML
INJECTION INTRAMUSCULAR; INTRAVENOUS AS NEEDED
Status: DISCONTINUED | OUTPATIENT
Start: 2020-05-26 | End: 2020-05-26 | Stop reason: HOSPADM

## 2020-05-26 RX ORDER — ONDANSETRON 2 MG/ML
4 INJECTION INTRAMUSCULAR; INTRAVENOUS AS NEEDED
Status: DISCONTINUED | OUTPATIENT
Start: 2020-05-26 | End: 2020-05-26 | Stop reason: HOSPADM

## 2020-05-26 RX ORDER — DEXAMETHASONE SODIUM PHOSPHATE 10 MG/ML
10 INJECTION INTRAMUSCULAR; INTRAVENOUS ONCE
Status: COMPLETED | OUTPATIENT
Start: 2020-05-26 | End: 2020-05-26

## 2020-05-26 RX ADMIN — DEXMEDETOMIDINE HYDROCHLORIDE 4 MCG: 100 INJECTION, SOLUTION, CONCENTRATE INTRAVENOUS at 09:46

## 2020-05-26 RX ADMIN — PROPOFOL 200 MG: 10 INJECTION, EMULSION INTRAVENOUS at 08:50

## 2020-05-26 RX ADMIN — SODIUM CHLORIDE, SODIUM LACTATE, POTASSIUM CHLORIDE, AND CALCIUM CHLORIDE 125 ML/HR: 600; 310; 30; 20 INJECTION, SOLUTION INTRAVENOUS at 08:36

## 2020-05-26 RX ADMIN — ROCURONIUM BROMIDE 20 MG: 10 SOLUTION INTRAVENOUS at 08:58

## 2020-05-26 RX ADMIN — FENTANYL CITRATE 100 MCG: 50 INJECTION, SOLUTION INTRAMUSCULAR; INTRAVENOUS at 08:50

## 2020-05-26 RX ADMIN — LIDOCAINE HYDROCHLORIDE 80 MG: 20 INJECTION, SOLUTION EPIDURAL; INFILTRATION; INTRACAUDAL; PERINEURAL at 08:50

## 2020-05-26 RX ADMIN — SUCCINYLCHOLINE CHLORIDE 180 MG: 20 INJECTION, SOLUTION INTRAMUSCULAR; INTRAVENOUS at 08:50

## 2020-05-26 RX ADMIN — MIDAZOLAM 2 MG: 1 INJECTION INTRAMUSCULAR; INTRAVENOUS at 08:38

## 2020-05-26 RX ADMIN — GLYCOPYRROLATE 0.4 MG: 0.2 INJECTION, SOLUTION INTRAMUSCULAR; INTRAVENOUS at 10:01

## 2020-05-26 RX ADMIN — DEXMEDETOMIDINE HYDROCHLORIDE 4 MCG: 100 INJECTION, SOLUTION, CONCENTRATE INTRAVENOUS at 09:56

## 2020-05-26 RX ADMIN — ROCURONIUM BROMIDE 15 MG: 10 SOLUTION INTRAVENOUS at 09:05

## 2020-05-26 RX ADMIN — ONDANSETRON HYDROCHLORIDE 4 MG: 2 INJECTION, SOLUTION INTRAMUSCULAR; INTRAVENOUS at 09:06

## 2020-05-26 RX ADMIN — ROCURONIUM BROMIDE 5 MG: 10 SOLUTION INTRAVENOUS at 08:50

## 2020-05-26 RX ADMIN — PROPOFOL 100 MG: 10 INJECTION, EMULSION INTRAVENOUS at 09:04

## 2020-05-26 RX ADMIN — ONDANSETRON 4 MG: 2 INJECTION INTRAMUSCULAR; INTRAVENOUS at 11:10

## 2020-05-26 RX ADMIN — DEXMEDETOMIDINE HYDROCHLORIDE 4 MCG: 100 INJECTION, SOLUTION, CONCENTRATE INTRAVENOUS at 09:41

## 2020-05-26 RX ADMIN — Medication 3 MG: at 10:01

## 2020-05-26 RX ADMIN — DEXMEDETOMIDINE HYDROCHLORIDE 4 MCG: 100 INJECTION, SOLUTION, CONCENTRATE INTRAVENOUS at 09:51

## 2020-05-26 RX ADMIN — Medication 2 G: at 09:06

## 2020-05-26 RX ADMIN — DEXAMETHASONE SODIUM PHOSPHATE 10 MG: 10 INJECTION, SOLUTION INTRAMUSCULAR; INTRAVENOUS at 09:06

## 2020-05-26 NOTE — ANESTHESIA PREPROCEDURE EVALUATION
Anesthetic History   No history of anesthetic complications            Review of Systems / Medical History  Patient summary reviewed, nursing notes reviewed and pertinent labs reviewed    Pulmonary        Sleep apnea: CPAP           Neuro/Psych         Psychiatric history     Cardiovascular  Within defined limits                Exercise tolerance: >4 METS     GI/Hepatic/Renal     GERD           Endo/Other    Diabetes  Hypothyroidism       Other Findings   Comments: Bladder stimulator  History of PE  IBS           Physical Exam    Airway  Mallampati: II  TM Distance: 4 - 6 cm  Neck ROM: normal range of motion   Mouth opening: Normal     Cardiovascular    Rhythm: regular  Rate: normal         Dental    Dentition: Lower dentition intact and Upper dentition intact     Pulmonary  Breath sounds clear to auscultation               Abdominal         Other Findings            Anesthetic Plan    ASA: 3  Anesthesia type: general          Induction: Intravenous  Anesthetic plan and risks discussed with: Patient

## 2020-05-26 NOTE — ANESTHESIA POSTPROCEDURE EVALUATION
Procedure(s):  RIGHT LASER STAPEDECTOMY WITH FASCIA GRAFT (C02 LASER) , PLACEMENT OF FACIAL NERVE MONITORING ELECTRODES. ..    general    Anesthesia Post Evaluation        Patient location during evaluation: PACU  Patient participation: complete - patient participated  Level of consciousness: awake and alert  Pain management: adequate  Airway patency: patent  Anesthetic complications: no  Cardiovascular status: acceptable  Respiratory status: acceptable  Hydration status: acceptable  Comments: I have seen and evaluated the patient and is ready for discharge.  Surendra Hawley MD    Post anesthesia nausea and vomiting:  none      Vitals Value Taken Time   /58 5/26/2020 11:15 AM   Temp 36.4 °C (97.6 °F) 5/26/2020 11:00 AM   Pulse 77 5/26/2020 11:15 AM   Resp 19 5/26/2020 11:15 AM   SpO2 96 % 5/26/2020 11:15 AM

## 2020-05-26 NOTE — OP NOTES
OPERATIVE REPORT     PREOPERATIVE DIAGNOSIS: Right conductive hearing loss/otosclerosis     POSTOPERATIVE DIAGNOSIS: Right conductive hearing loss/otosclerosis     OPERATIVE PROCEDURE  1. Right KTP laser stapedotomy  2. Fascia graft through separate incision. 3. Facial nerve monitoring x1 hour. 4. Placement of facial nerve electrodes. 5. Microdissection. SURGEON: Lili Davenport MD     ASSISTANT: None. ANESTHESIA: General.     COMPLICATIONS: None. ESTIMATED BLOOD LOSS: 5 cc     SPECIMEN: None. Implant: MRI safe Pb 4.5 x 0.5 mm titanium prosthesis      FINDINGS  1. Fixed Stapes  2. Chordae Tympani nerve stretched but intact  3. KTP laser stapedotomy performed. 4. A Titanium prosthesis was secured to  the incus (PB)  5. Fascia graft placed around oval window fistula for closure     PROCEDURE: After the patient gave informed consent, the patient was taken to the operating room. The patient was kept in the supine position, and was prepped and draped in the usual fashion. After the administration of general anesthesia, the table was turned 180 degrees away from the neutralposition. Facial nerve electrodes were placed in the right orbicularis oris and the right orbicularis oculi muscles. Continuous facial nerve monitoring took place throughout the entire case, with the baseline facial muscle activity less than 10 mA. There was no aberrant facial nerve stimulation throughout the entire case. The electrode impedances were checked preoperatively and found to be less than 1.0 kilohms. A tap test was utilized to verify facial nerve monitoring integrity. 10 cc lidocaine with 1:[id number] epinephrine was injected into the pre and post auricular regions. The operating microscope was used for the remainder of the  case. The operation was performed on the right side. A right postauricular incision was made the deeper layers  of the tissue were incised.  The temporalis fascia was identified and a small 1 x 1 cm piece of temporalis fascia was harvested and placed on  a Miah block. This incision was closed with 5-0 fast absorbing gut  suture in interrupted fashion     A speculum melo was utilized for the remainder of the  case. A speculum was placed in the external auditory canal. A tympanomeatal flap was constructed and raised, and the middle ear was entered. The chorda tympani was identified and preserved throughout the entire case albeit stretched some to facilitate prosthesis placement. Immediately, the incus was identified. The stapes was palpated and found to be fixed while the malleus and incus appeared mobile. At this point, the decision to do a KTP laser stapedotomy was made. Using a joint knife the incudostapedial joint was severed and the stapes  suprastructure was again palpated and found to be fixed. Using a KTP laser on a hand held fiber through the microscope, a standard stapedotomy was performed. The scutum was removed to reveal the facial nerve , round window and posterior marvin of the stapes. The foot plate appeared to be wide enough to facilitate a stapes prosthesis. The stapedial tendon was lysed with the laser and the posterior marvin was vaporized . The stapes suprastructure was then down-fractured and removed. Next, the foot plate was palpated and found to be quite rigid. At this point, using the KTP laser, a stapedotomy was then performed. The KTP Laser was utilized to perform a stapedotomy in the central region of the footplate. Minimal inner ear fluid escaped. I then  made the decision after appropriate measurement, to then use a 4.5 x 0.5 mm  titanium stapes prosthesis (vLine)  which was crimped around the incus and went into the  defect created in the oval window (stapedotomy site ) and appeared to be at the appropriate level. At this point, several pieces of the fascia graft was then used to close the fistula.  The tympanomeatal flap was reapproximated no perforations in the eardrum were present and all counts were correct at the end of the case. The external ear canal was packed with gelfoam and bacitracin. The patient tolerated the procedure well, and went to the PACU in stable  condition.                     Dereje Mccormick MD    cc:   Dereje Mccormick MD

## 2020-05-26 NOTE — ROUTINE PROCESS
Patient: Misty Holliday MRN: [id number]  SSN: [id number] YOB: 1970  Age: 52 y.o. Sex: female Patient is status post Procedure(s): RIGHT LASER STAPEDECTOMY WITH FASCIA GRAFT (C02 LASER) , PLACEMENT OF FACIAL NERVE MONITORING ELECTRODES. .. Surgeon(s) and Role: Gonzalo Hathaway MD - Primary Serena Area, MD - Resident - Assisting Local/Dose/Irrigation: 1% LIDOCAINE W EPINE 10ML RIGHT EAR Peripheral IV 05/26/20 Left Wrist (Active) Airway - Endotracheal Tube 05/26/20 Oral (Active) Dressing/Packing:  Wound Ear Right-Dressing Type: Cotton ball(s) (05/26/20 0900)

## 2020-05-26 NOTE — DISCHARGE INSTRUCTIONS
[facility]  POSTOPERATIVE INSTRUCTIONS  FOR PATIENTS UNDERGOING  RIGHT  STAPES SURGERY    1. Do not blow your nose for three weeks following surgery. If you sneeze or cough do so with your mouth open. 2. Avoid any heavy lifting (over 10 lbs.), straining or bending for three weeks following surgery. 3. Keep your head elevated as much as possible x 48 hours . Sleep and rest on two to three pillows if possible. NO CPAP USE X 2 WEEKS   4. Do not get water in your ear. If showering or washing your hair place a piece of cotton coated in Vaseline in the ear canal to seal it. If there is a separate incision keep this dry x 48 hours. 5. If you wear glasses either remove the arm on the operated side or make certain that it does not rest on the incision behind your ear for one week. 6. Beginning one day after surgery try to leave the cotton out of our ear as much as possible unless there is significant drainage. 7. Some drainage from your ear canal may occur after surgery. If there is a separate incision some drainage may occur from this area also. If the drainage is profuse or develops a foul odor please call. 8. Popping sounds, a plugged sensation, ringing or fluctuating hearing may be noticed in the ear during the healing. 9. Avoid travel by air for three weeks following surgery. 10. If you should notice any swelling, redness or excessive pain please call. 11. Some dizziness may occur after surgery. If it becomes severe or is associated with nausea or vomiting please call. 12. Please call The [address] [place], [place] [address]ChandlerTerrell Cole. to make an appointment to be seen 7-10 days after the time of your surgery unless stated otherwise by your physician. I understand and acknowledge receipt of the instructions indicated above.                                                                                                                                            Physician's or R.N.'s Signature Date/Time                                                                                                                                           Patient or Representative Signature                                                          Date/Time    ______________________________________________________________________    Anesthesia Discharge Instructions    After general anesthesia or intervenous sedation, for 24 hours or while taking prescription Narcotics:  · Limit your activities  · Do not drive or operate hazardous machinery  · If you have not urinated within 8 hours after discharge, please contact your surgeon on call. · Do not make important personal or business decisions  · Do not drink alcoholic beverages    Report the following to your surgeon:  · Excessive pain, swelling, redness or odor of or around the surgical area  · Temperature over 100.5 degrees  · Nausea and vomiting lasting longer than 4 hours or if unable to take medication  · Any signs of decreased circulation or nerve impairment to extremity:  Change in color, persistent numbness, tingling, coldness or increased pain. · Any questions  Patient Education   Learning About Coronavirus [phone])  Coronavirus [phone]): Overview  What is coronavirus (HPNVZ-54)? The coronavirus disease (COVID-19) is caused by a virus. It is an illness that was first found in [place], [place], in December 2019. It has since spread worldwide. The virus can cause fever, cough, and trouble breathing. In severe cases, it can cause pneumonia and make it hard to breathe without help. It can cause death. Coronaviruses are a large group of viruses. They cause the common cold. They also cause more serious illnesses like Middle East respiratory syndrome (MERS) and severe acute respiratory syndrome (SARS). COVID-19 is caused by a novel coronavirus.  That means it's a new type that has not been seen in people before. This virus spreads person-to-person through droplets from coughing and sneezing. It can also spread when you are close to someone who is infected. And it can spread when you touch something that has the virus on it, such as a doorknob or a tabletop. What can you do to protect yourself from coronavirus (COVID-19)? The best way to protect yourself from getting sick is to:  · Avoid areas where there is an outbreak. · Avoid contact with people who may be infected. · Wash your hands often with soap or alcohol-based hand sanitizers. · Avoid crowds and try to stay at least 6 feet away from other people. · Wash your hands often, especially after you cough or sneeze. Use soap and water, and scrub for at least 20 seconds. If soap and water aren't available, use an alcohol-based hand . · Avoid touching your mouth, nose, and eyes. What can you do to avoid spreading the virus to others? To help avoid spreading the virus to others:  · Cover your mouth with a tissue when you cough or sneeze. Then throw the tissue in the trash. · Use a disinfectant to clean things that you touch often. · Stay home if you are sick or have been exposed to the virus. Don't go to school, work, or public areas. And don't use public transportation. · If you are sick:  ? Leave your home only if you need to get medical care. But call the doctor's office first so they know you're coming. And wear a face mask, if you have one.  ? If you have a face mask, wear it whenever you're around other people. It can help stop the spread of the virus when you cough or sneeze. ? Clean and disinfect your home every day. Use household  and disinfectant wipes or sprays. Take special care to clean things that you grab with your hands. These include doorknobs, remote controls, phones, and handles on your refrigerator and microwave. And don't forget countertops, tabletops, bathrooms, and computer keyboards.   When to call for help  Call 911 anytime you think you may need emergency care. For example, call if:  · You have severe trouble breathing. (You can't talk at all.)  · You have constant chest pain or pressure. · You are severely dizzy or lightheaded. · You are confused or can't think clearly. · Your face and lips have a blue color. · You pass out (lose consciousness) or are very hard to wake up. Call your doctor now if you develop symptoms such as:  · Shortness of breath. · Fever. · Cough. If you need to get care, call ahead to the doctor's office for instructions before you go. Make sure you wear a face mask, if you have one, to prevent exposing other people to the virus. Where can you get the latest information? The following health organizations are tracking and studying this virus. Their websites contain the most up-to-date information. Ursula Aaronmon also learn what to do if you think you may have been exposed to the virus. · U.S. Centers for Disease Control and Prevention (CDC): The CDC provides updated news about the disease and travel advice. The website also tells you how to prevent the spread of infection. www.cdc.gov  · World Health Organization [facility]): WHO offers information about the virus outbreaks. WHO also has travel advice. www.who.int  Current as of: April 1, 2020               Content Version: 12.4  © 1698-7602 Healthwise, Incorporated. Care instructions adapted under license by your healthcare professional. If you have questions about a medical condition or this instruction, always ask your healthcare professional. [place] [address] any warranty or liability for your use of this information. [address] M.D.  Benjamin Meraz.   [address]  [phone]

## 2020-05-26 NOTE — BRIEF OP NOTE
Brief Postoperative Note    Patient: Ramakrishna Tellez  YOB: 1970  MRN: [id number]    Date of Procedure: 5/26/2020     Pre-Op Diagnosis: LABYRINTHINE DYSFUNCTION, BILATERAL, MIXED CONDUCTIVE AND SENSORINEURAL HEARING LOSS, UNILATERAL, RIGHT EAR, WITH UNRESTRICTED HEARING ON THECONTRALATERAL SIDE    Post-Op Diagnosis: Same as preoperative diagnosis. Procedure(s):  RIGHT LASER STAPEDECTOMY WITH FASCIA GRAFT (C02 LASER) , PLACEMENT OF FACIAL NERVE MONITORING ELECTRODES. .    Surgeon(s):  MD Rick Castro MD    Surgical Assistant: None    Anesthesia: General     Estimated Blood Loss (mL): less than 50     Complications: None    Specimens: * No specimens in log *     Implants:   Implant Name Type Inv.  Item Serial No.  Lot No. LRB No. Used Action   PROSTHESIS OSS STAP 0.5X4.5MM --  - [id number]  PROSTHESIS OSS STAP 0.5X4.5MM --   Hawa Forrester [id number] Right 1 Implanted       Drains: * No LDAs found *    Findings: fixated stapes    Electronically Signed by Toniann Habermann, MD on 5/26/2020 at 10:07 AM

## 2020-11-18 NOTE — ANESTHESIA PREPROCEDURE EVALUATION
Anesthetic History No history of anesthetic complications Review of Systems / Medical History Patient summary reviewed, nursing notes reviewed and pertinent labs reviewed Pulmonary Sleep apnea: CPAP Neuro/Psych Psychiatric history Cardiovascular Within defined limits Exercise tolerance: >4 METS 
  
GI/Hepatic/Renal 
  
GERD Endo/Other Diabetes Hypothyroidism Other Findings Comments: Bladder stimulator History of PE IBS Physical Exam 
 
Airway Mallampati: II 
TM Distance: 4 - 6 cm Neck ROM: normal range of motion Mouth opening: Normal 
 
 Cardiovascular Rhythm: regular Rate: normal 
 
 
 
 Dental 
 
Dentition: Lower dentition intact and Upper dentition intact Pulmonary Breath sounds clear to auscultation Abdominal 
 
 
 
 Other Findings Anesthetic Plan ASA: 3 Anesthesia type: MAC Induction: Intravenous Anesthetic plan and risks discussed with: Patient Spoke to mom. Mom stated patient has a fever of 101 and right eye is irritated and puffy. Mom stated these were signs Dr. Alston wanted mom to watch out for. Informed mom I will contact MD on call for today and will return mom's phone call.

## 2021-03-31 ENCOUNTER — TRANSCRIBE ORDER (OUTPATIENT)
Dept: SCHEDULING | Age: 51
End: 2021-03-31

## 2021-03-31 DIAGNOSIS — R05.3 CHRONIC COUGH: Primary | ICD-10-CM

## 2021-06-14 ENCOUNTER — TRANSCRIBE ORDER (OUTPATIENT)
Dept: SCHEDULING | Age: 51
End: 2021-06-14

## 2021-06-14 DIAGNOSIS — C50.912 MALIGNANT NEOPLASM OF LEFT BREAST (HCC): Primary | ICD-10-CM

## 2021-06-16 ENCOUNTER — HOSPITAL ENCOUNTER (OUTPATIENT)
Dept: CT IMAGING | Age: 51
Discharge: HOME | End: 2021-06-16
Attending: INTERNAL MEDICINE
Payer: MEDICARE

## 2021-06-16 DIAGNOSIS — R05.3 CHRONIC COUGH: ICD-10-CM

## 2021-06-16 DIAGNOSIS — C50.912 MALIGNANT NEOPLASM OF LEFT BREAST (HCC): ICD-10-CM

## 2021-06-16 PROCEDURE — 74011000636 HC RX REV CODE- 636: Performed by: RADIOLOGY

## 2021-06-16 PROCEDURE — 74177 CT ABD & PELVIS W/CONTRAST: CPT

## 2021-06-16 RX ADMIN — IOPAMIDOL 100 ML: 755 INJECTION, SOLUTION INTRAVENOUS at 11:21

## 2021-06-23 ENCOUNTER — HOSPITAL ENCOUNTER (OUTPATIENT)
Dept: NUCLEAR MEDICINE | Age: 51
Discharge: HOME | End: 2021-06-23
Payer: MEDICARE

## 2021-06-23 DIAGNOSIS — C50.912 MALIGNANT NEOPLASM OF LEFT BREAST (HCC): ICD-10-CM

## 2021-06-23 PROCEDURE — 78306 BONE IMAGING WHOLE BODY: CPT

## 2021-06-28 ENCOUNTER — TRANSCRIBE ORDER (OUTPATIENT)
Dept: SCHEDULING | Age: 51
End: 2021-06-28

## 2021-06-28 DIAGNOSIS — Z51.11 ENCOUNTER FOR ANTINEOPLASTIC CHEMOTHERAPY: Primary | ICD-10-CM

## 2021-06-28 DIAGNOSIS — C50.912 MALIGNANT NEOPLASM OF LEFT BREAST (HCC): Primary | ICD-10-CM

## 2021-06-28 DIAGNOSIS — Z01.818 OTHER SPECIFIED PRE-OPERATIVE EXAMINATION: Primary | ICD-10-CM

## 2021-06-29 ENCOUNTER — TRANSCRIBE ORDER (OUTPATIENT)
Dept: SCHEDULING | Age: 51
End: 2021-06-29

## 2021-06-29 DIAGNOSIS — R60.1 GENERALIZED EDEMA: ICD-10-CM

## 2021-06-29 DIAGNOSIS — Z51.11 ENCOUNTER FOR ANTINEOPLASTIC CHEMOTHERAPY: ICD-10-CM

## 2021-06-29 DIAGNOSIS — R60.9 EDEMA: ICD-10-CM

## 2021-06-29 DIAGNOSIS — Z51.11 ENCOUNTER FOR CHEMOTHERAPY MANAGEMENT: Primary | ICD-10-CM

## 2021-06-30 ENCOUNTER — HOSPITAL ENCOUNTER (OUTPATIENT)
Dept: NON INVASIVE DIAGNOSTICS | Age: 51
Discharge: HOME | End: 2021-06-30
Payer: MEDICARE

## 2021-06-30 VITALS
BODY MASS INDEX: 47.58 KG/M2 | DIASTOLIC BLOOD PRESSURE: 64 MMHG | HEIGHT: 61 IN | SYSTOLIC BLOOD PRESSURE: 132 MMHG | WEIGHT: 251.99 LBS

## 2021-06-30 DIAGNOSIS — R60.1 GENERALIZED EDEMA: ICD-10-CM

## 2021-06-30 DIAGNOSIS — Z51.11 ENCOUNTER FOR CHEMOTHERAPY MANAGEMENT: ICD-10-CM

## 2021-06-30 LAB
ECHO AO ASC DIAM: 2.87 CM
ECHO AO ROOT DIAM: 3.1 CM
ECHO AV AREA PEAK VELOCITY: 3.13 CM2
ECHO AV AREA/BSA PEAK VELOCITY: 1.5 CM2/M2
ECHO AV PEAK GRADIENT: 6.84 MMHG
ECHO AV PEAK VELOCITY: 130.75 CM/S
ECHO IVC PROX: 1.65 CM
ECHO LA AREA 4C: 14.57 CM2
ECHO LA MAJOR AXIS: 4.09 CM
ECHO LA MINOR AXIS: 1.97 CM
ECHO LA VOL 2C: 31.14 ML (ref 22–52)
ECHO LA VOL 4C: 35.2 ML (ref 22–52)
ECHO LA VOL BP: 41.32 ML (ref 22–52)
ECHO LA VOL/BSA BIPLANE: 19.87 ML/M2 (ref 16–28)
ECHO LA VOLUME INDEX A2C: 14.97 ML/M2 (ref 16–28)
ECHO LA VOLUME INDEX A4C: 16.92 ML/M2 (ref 16–28)
ECHO LV E' LATERAL VELOCITY: 8.46 CENTIMETER/SECOND
ECHO LV E' SEPTAL VELOCITY: 7.36 CENTIMETER/SECOND
ECHO LV EDV A2C: 68.66 ML
ECHO LV EDV A4C: 100.42 ML
ECHO LV EDV BP: 82.66 ML (ref 56–104)
ECHO LV EDV INDEX A4C: 48.3 ML/M2
ECHO LV EDV INDEX BP: 39.7 ML/M2
ECHO LV EDV NDEX A2C: 33 ML/M2
ECHO LV EJECTION FRACTION A2C: 64 PERCENT
ECHO LV EJECTION FRACTION A4C: 60 PERCENT
ECHO LV EJECTION FRACTION BIPLANE: 61.8 PERCENT (ref 55–100)
ECHO LV ESV A2C: 24.79 ML
ECHO LV ESV A4C: 39.91 ML
ECHO LV ESV BP: 31.61 ML (ref 19–49)
ECHO LV ESV INDEX A2C: 11.9 ML/M2
ECHO LV ESV INDEX A4C: 19.2 ML/M2
ECHO LV ESV INDEX BP: 15.2 ML/M2
ECHO LV INTERNAL DIMENSION DIASTOLIC: 4.81 CM (ref 3.9–5.3)
ECHO LV INTERNAL DIMENSION SYSTOLIC: 2.54 CM
ECHO LV IVSD: 0.73 CM (ref 0.6–0.9)
ECHO LV MASS 2D: 115.1 G (ref 67–162)
ECHO LV MASS INDEX 2D: 55.3 G/M2 (ref 43–95)
ECHO LV POSTERIOR WALL DIASTOLIC: 0.75 CM (ref 0.6–0.9)
ECHO LVOT DIAM: 2.27 CM
ECHO LVOT PEAK GRADIENT: 4.07 MMHG
ECHO LVOT PEAK VELOCITY: 100.91 CM/S
ECHO MV A VELOCITY: 105 CENTIMETER/SECOND
ECHO MV AREA PHT: 3.95 CM2
ECHO MV E DECELERATION TIME (DT): 192.23 MS
ECHO MV E VELOCITY: 78.86 CENTIMETER/SECOND
ECHO MV PRESSURE HALF TIME (PHT): 55.75 MS
ECHO RV INTERNAL DIMENSION: 3.38 CM
ECHO RV TAPSE: 1.88 CM (ref 1.5–2)
ECHO TV REGURGITANT MAX VELOCITY: 180.51 CM/S
ECHO TV REGURGITANT PEAK GRADIENT: 13.03 MMHG
LA VOL DISK BP: 34.75 ML (ref 22–52)

## 2021-06-30 PROCEDURE — 93306 TTE W/DOPPLER COMPLETE: CPT

## 2021-06-30 PROCEDURE — 93306 TTE W/DOPPLER COMPLETE: CPT | Performed by: INTERNAL MEDICINE

## 2022-01-14 ENCOUNTER — TRANSCRIBE ORDER (OUTPATIENT)
Dept: SCHEDULING | Age: 52
End: 2022-01-14

## 2022-01-14 DIAGNOSIS — R93.89 ABNORMAL CHEST CT: Primary | ICD-10-CM

## 2022-04-12 ENCOUNTER — HOSPITAL ENCOUNTER (OUTPATIENT)
Dept: CT IMAGING | Age: 52
Discharge: HOME | End: 2022-04-12
Attending: INTERNAL MEDICINE
Payer: MEDICARE

## 2022-04-12 DIAGNOSIS — R93.89 ABNORMAL CHEST CT: ICD-10-CM

## 2022-04-12 PROCEDURE — 71250 CT THORAX DX C-: CPT

## 2022-10-11 ENCOUNTER — TRANSCRIBE ORDER (OUTPATIENT)
Dept: SCHEDULING | Age: 52
End: 2022-10-11

## 2022-10-11 DIAGNOSIS — R93.89 ABNORMAL CHEST CT: Primary | ICD-10-CM

## 2023-01-31 ENCOUNTER — HOSPITAL ENCOUNTER (OUTPATIENT)
Dept: CT IMAGING | Age: 53
Discharge: HOME | End: 2023-01-31
Attending: INTERNAL MEDICINE
Payer: MEDICARE

## 2023-01-31 DIAGNOSIS — R93.89 ABNORMAL CHEST CT: ICD-10-CM

## 2023-01-31 PROCEDURE — 71250 CT THORAX DX C-: CPT

## 2023-02-03 ENCOUNTER — TRANSCRIBE ORDER (OUTPATIENT)
Dept: SCHEDULING | Age: 53
End: 2023-02-03

## 2023-02-17 RX ORDER — CLOZAPINE 100 MG/1
100 TABLET ORAL DAILY
COMMUNITY
End: 2023-02-17

## 2023-02-22 ENCOUNTER — HOSPITAL ENCOUNTER (OUTPATIENT)
Age: 53
Setting detail: OUTPATIENT SURGERY
Discharge: HOME | End: 2023-02-22
Attending: INTERNAL MEDICINE | Admitting: INTERNAL MEDICINE
Payer: MEDICARE

## 2023-02-22 VITALS
HEART RATE: 85 BPM | OXYGEN SATURATION: 95 % | RESPIRATION RATE: 24 BRPM | SYSTOLIC BLOOD PRESSURE: 122 MMHG | WEIGHT: 227.51 LBS | BODY MASS INDEX: 42.96 KG/M2 | TEMPERATURE: 98.7 F | HEIGHT: 61 IN | DIASTOLIC BLOOD PRESSURE: 52 MMHG

## 2023-02-22 LAB
APPEARANCE FLD: ABNORMAL
COLOR FLD: COLORLESS
GLUCOSE BLD STRIP.AUTO-MCNC: 141 MG/DL (ref 65–117)
LYMPHOCYTES NFR FLD: 84 %
Lab: NORMAL
MONOS+MACROS NFR FLD: 7 %
NEUTROPHILS NFR FLD: 9 %
NUC CELL # FLD: 211 /CU MM
RBC # FLD: >100 /CU MM
REFERENCE LAB,REFLB: NORMAL
SERVICE CMNT-IMP: ABNORMAL
SPECIMEN SOURCE FLD: ABNORMAL
TEST DESCRIPTION:,ATST: NORMAL

## 2023-02-22 PROCEDURE — 82962 GLUCOSE BLOOD TEST: CPT

## 2023-02-22 PROCEDURE — 87070 CULTURE OTHR SPECIMN AEROBIC: CPT

## 2023-02-22 PROCEDURE — 76040000019: Performed by: INTERNAL MEDICINE

## 2023-02-22 PROCEDURE — 87798 DETECT AGENT NOS DNA AMP: CPT

## 2023-02-22 PROCEDURE — 2709999900 HC NON-CHARGEABLE SUPPLY: Performed by: INTERNAL MEDICINE

## 2023-02-22 PROCEDURE — 77030022556 HC FCPS BIOP TIS ENDOSC BSC -B: Performed by: INTERNAL MEDICINE

## 2023-02-22 PROCEDURE — 87077 CULTURE AEROBIC IDENTIFY: CPT

## 2023-02-22 PROCEDURE — 74011000250 HC RX REV CODE- 250: Performed by: INTERNAL MEDICINE

## 2023-02-22 PROCEDURE — 83520 IMMUNOASSAY QUANT NOS NONAB: CPT

## 2023-02-22 PROCEDURE — 87278 LEGION PNEUMOPHILIA AG IF: CPT

## 2023-02-22 PROCEDURE — 89050 BODY FLUID CELL COUNT: CPT

## 2023-02-22 PROCEDURE — 87280 RESPIRATORY SYNCYTIAL AG IF: CPT

## 2023-02-22 PROCEDURE — 88112 CYTOPATH CELL ENHANCE TECH: CPT

## 2023-02-22 PROCEDURE — 87102 FUNGUS ISOLATION CULTURE: CPT

## 2023-02-22 PROCEDURE — 87116 MYCOBACTERIA CULTURE: CPT

## 2023-02-22 PROCEDURE — 87252 VIRUS INOCULATION TISSUE: CPT

## 2023-02-22 PROCEDURE — 74011250636 HC RX REV CODE- 250/636: Performed by: INTERNAL MEDICINE

## 2023-02-22 PROCEDURE — 87186 SC STD MICRODIL/AGAR DIL: CPT

## 2023-02-22 RX ORDER — FLUMAZENIL 0.1 MG/ML
0.2 INJECTION INTRAVENOUS
Status: DISCONTINUED | OUTPATIENT
Start: 2023-02-22 | End: 2023-02-22 | Stop reason: HOSPADM

## 2023-02-22 RX ORDER — LIDOCAINE HYDROCHLORIDE 40 MG/ML
SOLUTION TOPICAL ONCE
Status: COMPLETED | OUTPATIENT
Start: 2023-02-22 | End: 2023-02-22

## 2023-02-22 RX ORDER — LIDOCAINE HYDROCHLORIDE 20 MG/ML
20 INJECTION, SOLUTION INFILTRATION; PERINEURAL ONCE
Status: COMPLETED | OUTPATIENT
Start: 2023-02-22 | End: 2023-02-22

## 2023-02-22 RX ORDER — SODIUM CHLORIDE 0.9 % (FLUSH) 0.9 %
5-40 SYRINGE (ML) INJECTION EVERY 8 HOURS
Status: DISCONTINUED | OUTPATIENT
Start: 2023-02-22 | End: 2023-02-22 | Stop reason: HOSPADM

## 2023-02-22 RX ORDER — SODIUM CHLORIDE 0.9 % (FLUSH) 0.9 %
5-40 SYRINGE (ML) INJECTION AS NEEDED
Status: DISCONTINUED | OUTPATIENT
Start: 2023-02-22 | End: 2023-02-22 | Stop reason: HOSPADM

## 2023-02-22 RX ORDER — FENTANYL CITRATE 50 UG/ML
200 INJECTION, SOLUTION INTRAMUSCULAR; INTRAVENOUS
Status: DISCONTINUED | OUTPATIENT
Start: 2023-02-22 | End: 2023-02-22 | Stop reason: HOSPADM

## 2023-02-22 RX ORDER — NALOXONE HYDROCHLORIDE 0.4 MG/ML
0.4 INJECTION, SOLUTION INTRAMUSCULAR; INTRAVENOUS; SUBCUTANEOUS
Status: DISCONTINUED | OUTPATIENT
Start: 2023-02-22 | End: 2023-02-22 | Stop reason: HOSPADM

## 2023-02-22 RX ORDER — MIDAZOLAM HYDROCHLORIDE 1 MG/ML
5 INJECTION, SOLUTION INTRAMUSCULAR; INTRAVENOUS
Status: DISCONTINUED | OUTPATIENT
Start: 2023-02-22 | End: 2023-02-22 | Stop reason: HOSPADM

## 2023-02-22 NOTE — PROGRESS NOTES
Endoscopy discharge instructions have been reviewed and given to patient. The patient verbalized understanding and acceptance of instructions. Dr. Aileen Harris discussed with caregiver procedure findings and next steps.

## 2023-02-22 NOTE — DISCHARGE INSTRUCTIONS
[facility], [address] [facility] [place]  [id number]  1970         DISCHARGE INSTRUCTIONS  Discomfort:  Sore throat- throat lozenges or warm salt water gargle  redness at IV site- apply warm compress to area; if redness or soreness persist- contact your physician  Gaseous discomfort- walking, belching will help relieve any discomfort  Blood tinged secretions - this should stop within 2-3 hours  DIET  Nothing by mouth- do not eat or drink for two hours. You may eat and drink after 10:30              You may resume your regular diet - however -  remember your colon is empty                  and a heavy meal will produce gas. Avoid these foods:  vegetables, fried / greasy foods, carbonated drinks  ACTIVITY  You may not operate a vehicle for 12 hours  You may not engage in an occupation involving machinery or appliances for rest of today  You may not drink alcoholic beverages for at least 12 hours  Avoid making any critical decisions for at least 24 hour  You may resume your normal daily activities however it is recommended that you spend the remainder of the day resting -  avoid any strenuous activity. CALL M.D. ANY SIGN OF                                         Increasing pain, nausea, vomiting  Abdominal distension (swelling)  New increased bleeding (oral or rectal)  Fever (chills)  Pain in chest area  Bloody discharge from nose or mouth  Shortness of breath     You can resume your blood thinner at your next planned dose.      Call Dr. Holly Barboza office for following  Results of procedure / biopsy in 7-10 days  Telephone #  [phone]

## 2023-02-22 NOTE — H&P
Please see scanned H&P under the media portion of the chart. No changes and will proceed with bronch with BAL.

## 2023-02-22 NOTE — PERIOP NOTES
Endoscope was pre-cleaned at bedside immediately following procedure by Dawit. Pt transported to recovery, placed on bedside cardiac monitor. VSS. Report given to PACU TRINA Mendoza using SBAR format. Opportunity for questions provided, and answered.

## 2023-02-22 NOTE — DISCHARGE SUMMARY
[facility] [address] [place] [address] [address]  [id number]  1970       DISCHARGE INSTRUCTIONS  Discomfort:  Sore throat- throat lozenges or warm salt water gargle  redness at IV site- apply warm compress to area; if redness or soreness persist- contact your physician  Gaseous discomfort- walking, belching will help relieve any discomfort  Blood tinged secretions - this should stop within 2-3 hours  DIET  Nothing by mouth- do not eat or drink for two hours. You may eat and drink after 10:30   You may resume your regular diet - however -  remember your colon is empty     and a heavy meal will produce gas. Avoid these foods:  vegetables, fried / greasy foods, carbonated drinks  ACTIVITY  You may not operate a vehicle for 12 hours  You may not engage in an occupation involving machinery or appliances for rest of today  You may not drink alcoholic beverages for at least 12 hours  Avoid making any critical decisions for at least 24 hour  You may resume your normal daily activities however it is recommended that you spend the remainder of the day resting -  avoid any strenuous activity. CALL M.D. ANY SIGN OF   Increasing pain, nausea, vomiting  Abdominal distension (swelling)  New increased bleeding (oral or rectal)  Fever (chills)  Pain in chest area  Bloody discharge from nose or mouth  Shortness of breath    You can resume your blood thinner at your next planned dose.     Call Dr. Joseph Rodriges office for following  Results of procedure / biopsy in 7-10 days  Telephone #  [phone]

## 2023-02-22 NOTE — PROCEDURES
[facility]  [address] [facility]  [address] [facility] [address]  [facility], [facility]  [phone]    [facility]  1970  [id number]      Date of Procedure: 2/22/2023    Preoperative diagnosis: PULMONARY NODULES    Procedure: Procedure(s):  BRONCHOSCOPY    Indication: Abnormal CT chest    :  Gaudencio Paz MD    Assistant(s): Endoscopy Technician-1: Velma Kiser  Endoscopy Technician-2: Allison Bess LPN  Endoscopy RN-1: Kenya Vences RN  Endoscopy RN-2: Candy Terrazas RN    Anesthesia/Sedation:  Versed 3 mg IV and Fentanyl 100 mcg IV      Procedure Details:  After infomed consent was obtained for the procedure, with all risks and benefits of procedure explained the patient was taken to the endoscopy suite and placed in the supine position. Following sequential administration of sedation as per above, the bronchoscope was inserted into the mouth and advanced under direct vision to the lungs bilaterally. Airways grossly normal, but inflamed/friable appearing with lavage. BAL performed in the the RML and fluid sent for routine studies. Complications:   None noted; patient tolerated the procedure well.     EBL:  Minimal           Impression:    1. abnormal CT      Recommendations:   Discharge when meets criteria  Follow-up labs      Rayna France MD  2/22/2023  8:35 AM

## 2023-02-22 NOTE — PROGRESS NOTES
[facility]  1970  [id number]    Situation:  Verbal report received from: Robin Mccall RN  Procedure: Procedure(s) with comments:  BRONCHOSCOPY - wash  INJECTION    Background:    Preoperative diagnosis: PULMONARY NODULES  Postoperative diagnosis: 1. abnormal CT    :  Dr. Annabelle Rojo  Assistant(s): Endoscopy Technician-1: Kristel Neville  Endoscopy Technician-2: Duc Sterling LPN  Endoscopy RN-1: Janes Cardoso RN  Endoscopy RN-2: Nereu Fields RN    Specimens: * No specimens in log *  H. Pylori  no    Assessment:  Intra-procedure medications   Versed yes 3 mg  Fentanyl yes 100 mcg      Anesthesia gave intra-procedure sedation and medications, see anesthesia flow sheet yes    Intravenous fluids: NS@ KVO     Vital signs stable yes    Abdominal assessment: round and soft yes    Recommendation:  Discharge patient per MD order yes.   Family or Friend  - Hong Stern to share finding with family or friend yes

## 2023-02-24 LAB
ACID FAST STN SPEC: NEGATIVE
MYCOBACTERIUM SPEC QL CULT: NORMAL
RSV AG SPEC QL IF: NEGATIVE
SPECIMEN PREPARATION: NORMAL
SPECIMEN SOURCE: NORMAL
VIRUS SPEC CULT: NORMAL

## 2023-02-25 LAB
BACTERIA SPEC CULT: ABNORMAL
BACTERIA SPEC CULT: ABNORMAL
CMV DNA # BAL NAA+PROBE: NOT DETECTED IU/ML
GRAM STN SPEC: ABNORMAL
HSV1 DNA # BAL NAA+PROBE: NOT DETECTED COPIES/ML
HSV2 DNA # BAL NAA+PROBE: NOT DETECTED COPIES/ML
SERVICE CMNT-IMP: ABNORMAL

## 2023-02-27 LAB
L PNEUMO AG SPEC QL IF: NEGATIVE
SPECIMEN SOURCE: NORMAL

## 2023-03-02 LAB
SPECIMEN SOURCE: NORMAL
VIRUS SPEC CULT: NORMAL

## 2023-03-21 LAB
BACTERIA SPEC CULT: ABNORMAL
BACTERIA SPEC CULT: ABNORMAL
SERVICE CMNT-IMP: ABNORMAL

## 2023-03-27 ENCOUNTER — TRANSCRIBE ORDER (OUTPATIENT)
Dept: SCHEDULING | Age: 53
End: 2023-03-27

## 2023-03-27 DIAGNOSIS — R93.89 ABNORMAL CHEST CT: Primary | ICD-10-CM

## 2023-04-13 ENCOUNTER — HOSPITAL ENCOUNTER (OUTPATIENT)
Dept: CT IMAGING | Age: 53
Discharge: HOME | End: 2023-04-13
Attending: INTERNAL MEDICINE
Payer: MEDICARE

## 2023-04-13 DIAGNOSIS — R93.89 ABNORMAL CHEST CT: ICD-10-CM

## 2023-04-13 PROCEDURE — 71250 CT THORAX DX C-: CPT

## 2023-04-17 ENCOUNTER — TRANSCRIBE ORDER (OUTPATIENT)
Dept: SCHEDULING | Age: 53
End: 2023-04-17

## 2023-04-23 DIAGNOSIS — R93.89 ABNORMAL CHEST CT: Primary | ICD-10-CM

## 2023-10-02 ENCOUNTER — HOSPITAL ENCOUNTER (OUTPATIENT)
Facility: HOSPITAL | Age: 53
Discharge: HOME | End: 2023-10-05
Attending: INTERNAL MEDICINE
Payer: MEDICARE

## 2023-10-02 DIAGNOSIS — R93.89 ABNORMAL CHEST CT: ICD-10-CM

## 2023-10-02 PROCEDURE — 71250 CT THORAX DX C-: CPT

## 2024-04-12 ENCOUNTER — HOSPITAL ENCOUNTER (OUTPATIENT)
Facility: HOSPITAL | Age: 54
End: 2024-04-12
Attending: INTERNAL MEDICINE
Payer: MEDICARE

## 2024-04-12 DIAGNOSIS — R93.89 ABNORMAL CHEST CT: ICD-10-CM

## 2024-04-12 PROCEDURE — 71250 CT THORAX DX C-: CPT

## 2024-11-01 ENCOUNTER — HOSPITAL ENCOUNTER (OUTPATIENT)
Facility: HOSPITAL | Age: 54
Discharge: HOME | End: 2024-11-04
Attending: INTERNAL MEDICINE
Payer: MEDICARE

## 2024-11-01 DIAGNOSIS — R93.89 ABNORMAL CHEST CT: ICD-10-CM

## 2024-11-01 PROCEDURE — 71250 CT THORAX DX C-: CPT

## (undated) DEVICE — TRAY PREP DRY W/ PREM GLV 2 APPL 6 SPNG 2 UNDPD 1 OVERWRAP

## (undated) DEVICE — BITEBLOCK ENDOSCP 60FR MAXI WHT POLYETH STURDY W/ VELC WVN

## (undated) DEVICE — MASTISOL ADHESIVE LIQ 2/3ML

## (undated) DEVICE — SIMPLICITY FLUFF UNDERPAD 23X36, MODERATE: Brand: SIMPLICITY

## (undated) DEVICE — TRAP,MUCUS SPECIMEN, 80CC: Brand: MEDLINE

## (undated) DEVICE — BLUNTFILL WITH FILTER: Brand: MONOJECT

## (undated) DEVICE — BAG BELONG PT PERS CLEAR HANDL

## (undated) DEVICE — SOLUTION IRRIG 1000ML H2O STRL BLT

## (undated) DEVICE — BLADE ASSEMB CLP HAIR FINE --

## (undated) DEVICE — BLADE, TONGUE, 6", STERILE: Brand: MEDLINE

## (undated) DEVICE — 1200 GUARD II KIT W/5MM TUBE W/O VAC TUBE: Brand: GUARDIAN

## (undated) DEVICE — INTENDED FOR TISSUE SEPARATION, AND OTHER PROCEDURES THAT REQUIRE A SHARP SURGICAL BLADE TO PUNCTURE OR CUT.: Brand: BARD-PARKER ® CARBON RIB-BACK BLADES

## (undated) DEVICE — ELECTRODE,RADIOTRANSLUCENT,FOAM,3PK: Brand: MEDLINE

## (undated) DEVICE — BLADE OPHTH MINI BEAV SHRP --

## (undated) DEVICE — CATH SUC CTRL PRT TRIFLO 14FR --

## (undated) DEVICE — INFECTION CONTROL KIT SYS

## (undated) DEVICE — TUBING SUCT MASTOID TWO IRRIG SPIK ALL IN 1 LTWT FLX LEVIN

## (undated) DEVICE — Device

## (undated) DEVICE — HANDLE LT SNAP ON ULT DURABLE LENS FOR TRUMPF ALC DISPOSABLE

## (undated) DEVICE — SET ADMIN 16ML TBNG L100IN 2 Y INJ SITE IV PIGGY BK DISP (ORDER IN MULIPLES OF 48)

## (undated) DEVICE — SOLUTION IV 1000ML 0.9% SOD CHL

## (undated) DEVICE — NDL PRT INJ NSAF BLNT 18GX1.5 --

## (undated) DEVICE — KIT COLON DUAL END BRSH W/LUBE -- CUSTOM BX/20 FORMERLY KS-18-20

## (undated) DEVICE — ELECTRODE 8227410 PAIRED 2 CH SET ROHS

## (undated) DEVICE — SYR 3ML LL TIP 1/10ML GRAD --

## (undated) DEVICE — CATH IV AUTOGRD BC PNK 20GA 25 -- INSYTE

## (undated) DEVICE — AIRLIFE™ CORRUGATED FLEXIBLE EVA TUBING FOR AEROSOL AND IPPB USE, SEGMENTED, 6 FEET (1.8 M) LENGTH, 22 MM I.D.: Brand: AIRLIFE™

## (undated) DEVICE — SOLIDIFIER MEDC 1200ML -- CONVERT TO 356117

## (undated) DEVICE — ADULT SPO2 SENSOR: Brand: NELLCOR

## (undated) DEVICE — CANN NASAL O2 CAPNOGRAPHY AD -- FILTERLINE

## (undated) DEVICE — PAD,EYE,1-5/8X2 5/8,STERILE,LF,1/PK: Brand: MEDLINE

## (undated) DEVICE — SYR 5ML 1/5 GRAD LL NSAF LF --

## (undated) DEVICE — KENDALL RADIOLUCENT FOAM MONITORING ELECTRODE -RECTANGULAR SHAPE: Brand: KENDALL

## (undated) DEVICE — STERILE POLYISOPRENE POWDER-FREE SURGICAL GLOVES WITH EMOLLIENT COATING: Brand: PROTEXIS

## (undated) DEVICE — CONTAINER SPEC 20 ML LID NEUT BUFF FORMALIN 10 % POLYPR STS

## (undated) DEVICE — BLOCK BTE ENDOSCP AD 60FR 20MM -- MAXI BITE LF STRAP

## (undated) DEVICE — SET ADMIN 16ML TBNG L100IN 2 Y INJ SITE IV PIGGY BK DISP

## (undated) DEVICE — AIR SHEET,LAT,COMFORT GLIDE, BLEND 40X80: Brand: MEDLINE

## (undated) DEVICE — DRAPE MICSCP W46XL120IN FOR ZEISS MD

## (undated) DEVICE — SYR LR LCK 1ML GRAD NSAF 30ML --

## (undated) DEVICE — SYR 10ML CTRL LR LCK NSAF LF --

## (undated) DEVICE — AIRLIFE™ ADULT OXYGEN MASK VINYL, UNDER-THE-CHIN STYLE, 3 IN 1 MASK WITH 7 FEET (2.1 M) CRUSH-RESISTANT TUBING AND U/CONNECT-IT ADAPTER: Brand: AIRLIFE™

## (undated) DEVICE — FORCEPS BX L240CM JAW DIA2.8MM L CAP W/ NDL MIC MESH TOOTH

## (undated) DEVICE — FCPS BIOP PULM RAD JAW 100CML -- BX/10 M00515180

## (undated) DEVICE — STRAP,POSITIONING,KNEE/BODY,FOAM,4X60": Brand: MEDLINE

## (undated) DEVICE — CANNULA CUSH AD W/ 14FT TBG

## (undated) DEVICE — CATH IV AUTOGRD BC BLU 22GA 25 -- INSYTE

## (undated) DEVICE — 1860S HEALTH CARE RESPIRATOR N95 120EA/C: Brand: 3M™

## (undated) DEVICE — SURGIFOAM SPNG SZ 12-7

## (undated) DEVICE — SYRINGE,EAR/ULCER, 2 OZ, STERILE: Brand: MEDLINE

## (undated) DEVICE — NEEDLE HYPO 25GA L1.5IN BLU POLYPR HUB S STL REG BVL STR

## (undated) DEVICE — BLADE TYMPLSTY W2.5MM 60DEG SHRP ALL ARND BVL DN

## (undated) DEVICE — BLUNTFILL: Brand: MONOJECT

## (undated) DEVICE — SET IV ADMIN GRAVITY W/STPCOCK --

## (undated) DEVICE — 1010 S-DRAPE TOWEL DRAPE 10/BX: Brand: STERI-DRAPE™

## (undated) DEVICE — WIPE 400300 MEROCEL 20PK INSTRUMENT: Brand: MEROCEL®

## (undated) DEVICE — COVER,MAYO STAND,STERILE: Brand: MEDLINE

## (undated) DEVICE — BAG SPEC BIOHZRD 10 X 10 IN --

## (undated) DEVICE — PACK,EENT,TURBAN DRAPE,PK II: Brand: MEDLINE

## (undated) DEVICE — SYR 10ML ECC TIP LF --

## (undated) DEVICE — Device: Brand: JELCO

## (undated) DEVICE — GARMENT,MEDLINE,DVT,INT,CALF,MED, GEN2: Brand: MEDLINE

## (undated) DEVICE — GOWN,SIRUS,NONRNF,SETINSLV,XL,20/CS: Brand: MEDLINE

## (undated) DEVICE — SURGICAL PROCEDURE PACK BASIN MAJ SET CUST NO CAUT

## (undated) DEVICE — BASIN EMSIS 16OZ GRAPHITE PLAS KID SHP MOLD GRAD FOR ORAL

## (undated) DEVICE — KIT COLON W/ 1.1OZ LUB AND 2 END

## (undated) DEVICE — NDL FLTR TIP 5 MIC 18GX1.5IN --

## (undated) DEVICE — STERILE COTTON BALLS LARGE 5/P: Brand: MEDLINE

## (undated) DEVICE — SYRINGE 50ML E/T

## (undated) DEVICE — SUTURE PLN GUT SZ 5-0 L18IN ABSRB YELLOWISH TAN L13MM PC-1 1915G